# Patient Record
Sex: MALE | URBAN - METROPOLITAN AREA
[De-identification: names, ages, dates, MRNs, and addresses within clinical notes are randomized per-mention and may not be internally consistent; named-entity substitution may affect disease eponyms.]

---

## 2022-11-02 ENCOUNTER — OFFICE VISIT (OUTPATIENT)
Dept: URBAN - METROPOLITAN AREA CLINIC 76 | Facility: CLINIC | Age: 47
End: 2022-11-02
Payer: MEDICARE

## 2022-11-02 DIAGNOSIS — H52.13 MYOPIA, BILATERAL: ICD-10-CM

## 2022-11-02 DIAGNOSIS — H10.45 OTHER CHRONIC ALLERGIC CONJUNCTIVITIS: Primary | ICD-10-CM

## 2022-11-02 DIAGNOSIS — H16.213 EXPOSURE KERATOCONJUNCTIVITIS, BILATERAL: ICD-10-CM

## 2022-11-02 PROCEDURE — 99203 OFFICE O/P NEW LOW 30 MIN: CPT | Performed by: OPTOMETRIST

## 2022-11-02 ASSESSMENT — KERATOMETRY
OD: 42.88
OS: 42.88

## 2022-11-02 ASSESSMENT — INTRAOCULAR PRESSURE
OD: 15
OS: 15

## 2022-11-02 ASSESSMENT — VISUAL ACUITY
OS: 20/20
OD: 20/20

## 2022-11-02 NOTE — IMPRESSION/PLAN
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 OD>OS. inf spk OD. Floppy eyelids OU. Plan: Blink more frequently and thoroughly, especially during extended near/computer work. Discussed floppy eyelids.

## 2023-05-16 NOTE — IMPRESSION/PLAN
Impression: Myopia, bilateral: H52.13. gavin BATES 1 yr. Plan: A glasses prescription has been discussed and generated. Patient to call with any concerns.  Advise DE when able home